# Patient Record
Sex: MALE | Race: WHITE | URBAN - METROPOLITAN AREA
[De-identification: names, ages, dates, MRNs, and addresses within clinical notes are randomized per-mention and may not be internally consistent; named-entity substitution may affect disease eponyms.]

---

## 2017-05-15 ENCOUNTER — TELEPHONE (OUTPATIENT)
Dept: PODIATRY CLINIC | Facility: CLINIC | Age: 27
End: 2017-05-15

## 2017-05-15 NOTE — TELEPHONE ENCOUNTER
Called patient, no answer, left voicemail. Dr. Carolyn Flower has retired and office is permanently closed. Patient has custom orthotics that were never picked up.   We will move those to [address] for  and they will hold them for 30 days only, then they